# Patient Record
Sex: FEMALE | Race: WHITE | NOT HISPANIC OR LATINO | Employment: OTHER | URBAN - METROPOLITAN AREA
[De-identification: names, ages, dates, MRNs, and addresses within clinical notes are randomized per-mention and may not be internally consistent; named-entity substitution may affect disease eponyms.]

---

## 2017-05-26 ENCOUNTER — TELEPHONE (OUTPATIENT)
Dept: TRANSPLANT | Facility: CLINIC | Age: 52
End: 2017-05-26

## 2017-05-26 NOTE — TELEPHONE ENCOUNTER
"Living Kidney Donor Evaluation Completed: May 25, 2017 20:25:46 CT Updated: May 25, 2017 20:27:12 CT  Donor Name: Donna Najera MRN: [id number] Note: : 1965 Age: 51Gender: Female Donor Height: 5  5\" Weight (lb): 168 BMI: 28.0  Donor Race:  Ethnicity: Not / Donor Preferred Language: English  Required?: No Current Marital Status:   Demographics: Home Address: [address] [place] [address] City: [place] State: [place] Zip: [address] Country: [place]  Best Phone: [phone]  Alt Phone: Donor Email: [email or web address] Best Phone Type: Mobile Alt Phone Type:   Preferred Contact Time(s): 1:00 PM-4:00 PM Preferred Contact Day(s): Mon, Tue, Wed, Thur, Fri  Donor Screen: PASSED Donor Referred by: Tx Candidate Donor self reported ABO: Unknown  Recipient Information: Recipient Name (Last, First): Azul Xavier Recipient :    1951  ... Donor Relationship: Member of the same community Recipient Diagnosis: Recipient ABO:   MEDICAL HISTORY:  \"total Hysterectomy \"  Dysfunctional Uterine Bleeding  History of pregnancy  MEDICATIONS:  None Reported  SURGICAL HISTORY:  Appendectomy  Cholecystectomy  Gastric Bypass, NOS  Hysterectomy and Oophorectomy  Splenectomy  Tubal Ligation  ALLERGIES:  Demerol : Nausea and/or Vomiting  SOCIAL HISTORY:  EtOH: Rare (1-2 drinks/year)  Illicit Drug Use: Denies  Tobacco: Current (1/2 ppd x 10 years)  SELF-REPORTED FUNCTIONAL STATUS:  \"I am able to participate in moderate recreational activities like golf, double tennis, dancing, throwing a baseball or football\"  Exercise (Not on a regular basis)  REVIEW OF ORGAN SYSTEMS: Airway or Lungs: No Blood Disorder: No Cancer: No Diabetes,Thyroid,Adrenal,Endocrine Disorder: No Digestive or Liver: No Female Health: Yes Heart or Circulatory System: No Immune Diseases: No Kidneys and Bladder: No Muscles,Bones,Joints: No Neuro: No Psych: No  FAMILY HISTORY: Confirmed:  Cancer (Mother, Father, " Sibling)  Denied:  Diabetes (denies)  Heart Disease (denies)  Hypertension (denies)  Kidney Disease (denies)  Kidney Stones (denies)  DONOR INFORMATION:  Level of Education: Some college education 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 (NO)  Commercial sex < 12 months. (NO)  Illicit IV drug use < 5yrs. (NO)  Other high risk sexual contact < 12 months. (NO)  EMERGENCY CONTACT INFORMATION:  Primary Secondary First Name: Shaila Last Name: Dania Phone Number: [phone]  Relationship: Child  First Name: Pino  Last Name: Dania Phone Number: [phone]  Relationship: Spouse  REASON FOR DONATION:   I have know the recipient for the past 9 months and I have seen her go through all of the ups and downs with her current illness and I just feel very healthy and I want to give back to someone and she has so much to live for and I want to help her.  PHYSICIAN CONTACT INFORMATION:

## 2017-05-26 NOTE — LETTER
REIMBURSEMENT INFORMATION FOR LIVING ORGAN DONORS    LIVING ORGAN DONOR: This form MUST accompany & remain attached to Orders &  given to Provider and/or Healthcare Facility Business Office    PROVIDER/FACILITY INSTRUCTIONS: By accepting to perform these services for living organ  donation, the provider/facility agrees to exclusively bill the [facility] instead of billing  the patient or any insurance provider and agrees to accept the reimbursement, as described below, as  payment in full for services rendered.    PROVIDER BILLING INSTRUCTIONS:  1. [facility] agrees to pay for all authorized testing ordered by our transplant  program that is related to living organ donation. The attached orders/tests are part of the donor  Evaluation.    2. Do not bill the donor or donor's insurance. Send an itemized invoice, claim or statement to:    [facility]  Transplant Finance/Donor Billing  [address] [place] [address]    3. Billing statements must include the patient first and last name, date of birth, the CPT procedure code  and date of service. Please bill service on the ORIGINAL UBO4 or 1500 with appropriate CPT/HCPCS  codes along with W-9 and send to the above address to insure timely reimbursement.    4. Claims should be submitted no later than six months from the date when services are rendered.  Claims denied for late submission should not be billed to the donor or their private insurance carrier.    5. [facility] will reimburse all charges at 100% of the Medicare Fee Schedule as  defined in the Code of Federal Regulations (CFR) 42, Chapter IV. This is to be considered payment  in full. [facility], the patient, and/or the patient's insurance are NOT to  be billed any balance, co-payment, or deductible, per Medicare regulations. **ATTN: Facility  providing services for attached/enclosed Living  Donor Orders; If facility does NOT AGREE to  the reimbursement rate stated above, PLEASE DENY SERVICES & refer Donor/patient back to  their [facility].    6. Patients are NOT to make any payments at the time of service.    Please forward this information to your billing department so that a donor account can be set up with  these instructions.    Should you have any questions, please contact the Solid Organ Transplant office and ask for donor billing  at [phone] or [phone], Monday - Friday, 8:00 a.m. to 4:00 p.m.  Thank you for your assistance.

## 2017-06-01 NOTE — TELEPHONE ENCOUNTER
Thinks abo=AB which is incompat. Discussed PEP and will think over.Hx 2 Gastric Bypass surgeries. First was in 1989 and it was a Vertical Banding. Second was Ruen Y in 2007. Discussed need for [employer] .Currently smokes and cessation was discussed. Will now send donor pkt ONLY with [employer] sheet and billing letter.

## 2017-11-13 ENCOUNTER — TELEPHONE (OUTPATIENT)
Dept: TRANSPLANT | Facility: CLINIC | Age: 52
End: 2017-11-13

## 2021-06-01 ENCOUNTER — RECORDS - HEALTHEAST (OUTPATIENT)
Dept: ADMINISTRATIVE | Facility: CLINIC | Age: 56
End: 2021-06-01

## 2021-06-02 ENCOUNTER — RECORDS - HEALTHEAST (OUTPATIENT)
Dept: ADMINISTRATIVE | Facility: CLINIC | Age: 56
End: 2021-06-02

## 2021-06-03 ENCOUNTER — RECORDS - HEALTHEAST (OUTPATIENT)
Dept: ADMINISTRATIVE | Facility: CLINIC | Age: 56
End: 2021-06-03

## 2021-06-16 PROBLEM — K56.609 SBO (SMALL BOWEL OBSTRUCTION) (H): Status: ACTIVE | Noted: 2020-10-11

## 2021-06-16 PROBLEM — K56.609 SMALL BOWEL OBSTRUCTION (H): Status: ACTIVE | Noted: 2020-10-11

## 2021-06-16 PROBLEM — B02.29 POST HERPETIC NEURALGIA: Status: ACTIVE | Noted: 2020-10-11

## 2021-06-16 PROBLEM — U07.1 CLINICAL DIAGNOSIS OF COVID-19: Status: ACTIVE | Noted: 2020-10-11

## 2021-06-16 PROBLEM — Z98.84 H/O GASTRIC BYPASS: Status: ACTIVE | Noted: 2020-10-11

## 2021-06-16 PROBLEM — R91.8 PULMONARY NODULES: Status: ACTIVE | Noted: 2020-10-11

## 2021-07-21 ENCOUNTER — RECORDS - HEALTHEAST (OUTPATIENT)
Dept: ADMINISTRATIVE | Facility: CLINIC | Age: 56
End: 2021-07-21

## 2022-06-14 ENCOUNTER — OFFICE VISIT (OUTPATIENT)
Dept: ENDOCRINOLOGY | Facility: CLINIC | Age: 57
End: 2022-06-14
Payer: COMMERCIAL

## 2022-06-14 VITALS
HEART RATE: 79 BPM | DIASTOLIC BLOOD PRESSURE: 91 MMHG | WEIGHT: 205.3 LBS | SYSTOLIC BLOOD PRESSURE: 155 MMHG | BODY MASS INDEX: 34.2 KG/M2 | OXYGEN SATURATION: 99 % | HEIGHT: 65 IN

## 2022-06-14 DIAGNOSIS — Z98.84 H/O GASTRIC BYPASS: Primary | ICD-10-CM

## 2022-06-14 DIAGNOSIS — R10.13 ABDOMINAL PAIN, EPIGASTRIC: ICD-10-CM

## 2022-06-14 DIAGNOSIS — K56.609 SMALL BOWEL OBSTRUCTION (H): ICD-10-CM

## 2022-06-14 PROBLEM — R60.0 EDEMA OF LOWER EXTREMITY: Status: ACTIVE | Noted: 2022-06-14

## 2022-06-14 PROBLEM — G47.33 OBSTRUCTIVE SLEEP APNEA SYNDROME: Status: ACTIVE | Noted: 2022-06-14

## 2022-06-14 PROBLEM — E61.1 IRON DEFICIENCY: Status: ACTIVE | Noted: 2020-02-04

## 2022-06-14 PROBLEM — Z86.16 HISTORY OF 2019 NOVEL CORONAVIRUS DISEASE (COVID-19): Status: ACTIVE | Noted: 2020-12-10

## 2022-06-14 PROCEDURE — 99205 OFFICE O/P NEW HI 60 MIN: CPT | Performed by: NURSE PRACTITIONER

## 2022-06-14 RX ORDER — DEXTROAMPHETAMINE SACCHARATE, AMPHETAMINE ASPARTATE, DEXTROAMPHETAMINE SULFATE AND AMPHETAMINE SULFATE 5; 5; 5; 5 MG/1; MG/1; MG/1; MG/1
TABLET ORAL
COMMUNITY
Start: 2022-02-22

## 2022-06-14 RX ORDER — OMEPRAZOLE 40 MG/1
40 CAPSULE, DELAYED RELEASE ORAL DAILY
COMMUNITY
Start: 2022-05-05

## 2022-06-14 RX ORDER — DEXTROAMPHETAMINE SACCHARATE, AMPHETAMINE ASPARTATE MONOHYDRATE, DEXTROAMPHETAMINE SULFATE AND AMPHETAMINE SULFATE 7.5; 7.5; 7.5; 7.5 MG/1; MG/1; MG/1; MG/1
CAPSULE, EXTENDED RELEASE ORAL
COMMUNITY
Start: 2022-05-26

## 2022-06-14 RX ORDER — HYOSCYAMINE SULFATE 0.125 MG
0.12 TABLET ORAL EVERY 4 HOURS PRN
Qty: 60 TABLET | Refills: 1 | Status: SHIPPED | OUTPATIENT
Start: 2022-06-14

## 2022-06-14 RX ORDER — CYANOCOBALAMIN 1000 UG/ML
INJECTION, SOLUTION INTRAMUSCULAR; SUBCUTANEOUS
COMMUNITY
Start: 2019-12-02

## 2022-06-14 RX ORDER — IBUPROFEN 600 MG/1
600 TABLET, FILM COATED ORAL EVERY 6 HOURS PRN
COMMUNITY

## 2022-06-14 RX ORDER — ONDANSETRON 4 MG/1
4 TABLET, ORALLY DISINTEGRATING ORAL
COMMUNITY
Start: 2022-04-27

## 2022-06-14 RX ORDER — ACETAMINOPHEN 500 MG
650 TABLET ORAL EVERY 6 HOURS PRN
COMMUNITY

## 2022-06-14 ASSESSMENT — PAIN SCALES - GENERAL: PAINLEVEL: MODERATE PAIN (5)

## 2022-06-14 NOTE — LETTER
Date:Nena 15, 2022      Provider requested that no letter be sent. Do not send.       [facility]

## 2022-06-14 NOTE — LETTER
"2022       RE: Donna Najera  [address]  [facility] [address]     Dear Colleague,    Thank you for referring your patient, Donna Najera, to the [facility]. Please see a copy of my visit note below.    New Re-establish Bariatric Surgery Consultation Note    2022    RE: Donna Najera  MR#: [id number]  : 1965      Referring provider:       6/10/2022   Who referred you? France Arriaga NP       Chief Complaint/Reason for visit: re-establish bariatric care    Dear No primary care provider on file.,    I had the pleasure of seeing your patient, Donna Najera, to re-establish bariatric care. As you know, Donna Najera is 56 year old.  She has a height of 5' 5\", a weight of 205 lbs 4.8 oz, and calculated Body mass index is 34.16 kg/m .. She has a history of VBG to RNYGB with Dr. Coffey in  at [facility]. Was having recurrent episodes of nausea/ vomiting along with persistent difficulty with weight loss. 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 VBG 16 years prior to that.     Assessment & Plan   Problem List Items Addressed This Visit        Digestive    Small bowel obstruction (H)       Other    H/O gastric bypass - Primary     VBG in early  with revision to RNYGB with Dr. JACOME in  due to recurrent episodes of nausea and vomiting and difficulty with weight loss. Revision there was extensive dense adhesions and ultimately spleen needed to be removed. She did well for a number of years s/p RNYGB with weight loss and maintenance. She had significant epigastric pain starting in  and diagnosed with H.Pylori in . During that time she was treated for pain with opioids and ended up becoming addicted. She was treated in rehab and is now clean, trying to avoid opioids when able. 2 episodes of SBO between  and  and otherwise did very well between  " "and 2020- minimal issues, felt great. In early 2020, she contracted covid 19 and had significant course of infection and has had persistent long haul symptoms to include headaches, narcolepsy, rashes, fatigue to where there will be days in a row where she \"hybernates\", increased arthritis. Significant decrease in activity level from fatigue and arthritis contributing to weight gain of 40lb. Has also noted increased frequency of SOB since 2020 (5 in the last 2 years) most recently 4/2022. She notes each episode of SOB becomes more sever and more difficult to manage.     Since hospitalization x 3 days in April for SOB. She reports onset of epigastric pain with swallowing of liquids and solids which lasts 1-2 hours and often gradually moves to RUQ pain over time. Pain is described as a grabbing pain of the intestines. She does not however get these symptoms with every eating episode. If she keeps food \"mushy\" she tolerates it much better. For pain she has tried omeprazole 40mg daily, ibuprofen 600mg (2-3 at a time), maalox, gasx none of which are particularly helpful. Soda, cold water, spicy food, meat tend to make things worse. She notes tenderness to crossing arms across epigastric region of stomach. Negative H.Pylori in May 2022.    No tolerating any supplements- pain, upset stomach     CT scans reviewed.    Concern for stricture based on Ct and symptoms. Recommend EGD. Suspect adhesions contributing to stool burden and abdominal pain. Will review with bariatric surgeon. Discussed keeping stool soft- metamucil already on board and will add miralax daily. Discussed that adhesions are not likely to be repaired surgically as it increases risk for additional adhesions but will defer to surgeon. Discussed potential for referral to pain management in the future. Will start with trying levsin for cramping. Declines maximizing PPI at this time. Encouraged decrease NSAID use due to risk for ulcers.     Discussed diet and how " that is potentially impacting her symptoms. High carbohydrate diets cause gas and bloating s/p RNYGB. Decreasing CHO will significantly improve symptoms. Additionally, avoiding carbonation can be beneficial. She declines dietitian visit at this time.     Bariatric labs ordered. Concern for developing vitamin deficiency and malnutrition. Recommend adding lactos free protein shake- will find list.    Plan:  Schedule endoscopy   -Call Carl at [phone] for scheduling  Labs when able  Continue omeprazole 40mg   Miralax 1 capful daily in 8 oz of water daily   Continue metamucil   Consider working on decreasing bread, rice, pasta- can help with bloating and gas and GI issues   Consider trying non-dairy / non lactose protein shake                Relevant Medications    hyoscyamine (LEVSIN) 0.125 MG tablet    Other Relevant Orders    Comprehensive metabolic panel    Ferritin    CBC with platelets    Parathyroid Hormone Intact    Vitamin A    Vitamin D Deficiency    Vitamin B12    Vitamin B1 plasma    Adult Gastro Ref - Procedure Only      Other Visit Diagnoses     Abdominal pain, epigastric        Relevant Medications    hyoscyamine (LEVSIN) 0.125 MG tablet    Other Relevant Orders    Comprehensive metabolic panel    Ferritin    CBC with platelets    Parathyroid Hormone Intact    Vitamin A    Vitamin D Deficiency    Vitamin B12    Vitamin B1 plasma    Adult Gastro Ref - Procedure Only             89 minutes spent on the date of the encounter doing chart review, history and exam, documentation and further activities per the note      HISTORY OF PRESENT ILLNESS:  Weight Loss History Reviewed with Patient 6/10/2022   How long have you been overweight? Since late teens through early 20's   What is the most that you have ever weighed? 360   What is the most weight you have lost? 180   I have tried the following methods to lose weight Watching portions or calories, Weight Watchers, Slimfast, Prescription Medications, Weight  "Loss Surgery   Have you ever had weight loss surgery? Yes   Please select the type of weight loss surgery you had (select all that apply): gastric bypass / Marcella-en-Y, vertical banded gastroplasty / VBG, revision of previous weight loss surgery     - H.Pylori - was having a lot of abdominal pain with this.   Opioid addiction for time after that     5 SBO since . Increased frequency since - 2 episodes since then. Long haul covid since - significant decrease in activity level with increased weight as a result. Seem to be more difficult to recover from     2022 ED  Abdominal pain/ nausea/ vomiting - CT showed \"mild to moderate stool throughout the colon with fecalization of the distal ileum which may be due to the stool burden, no evidence of obstruction\". Was hospitalized for 3 days.     S/p bowel obstruction 4/10/2022    New symptoms  Painful swallowing of solids and liquids  Can feel things go through     Pain- pain not after every meal, comes and goes, lasting 1-2 hours, maalox and gas x not helpful    Soda makes it worse, cold water, spicy foods, meat - all at the epigastric region   Also tender to pressure.   Only able to eat \"mushy\" things right now without pain   Omeprazole 40mg once daily   +nsaids for pain     -h.pylori May 2022     Supplements: can't swallow and causes abdominal pain    Lactose intolerance   Dumping with sweets and ice cream      Hypoglycemia- down as low as 32- early morning or going too long with out eating       CO-MORBIDITIES OF OBESITY INCLUDE:     6/10/2022   I have the following health issues associated with obesity: Pre-Diabetes, High Cholesterol, Fatty Liver, Hypothyroidism       PAST MEDICAL HISTORY:  History reviewed. No pertinent past medical history.    PAST SURGICAL HISTORY:  Past Surgical History:   Procedure Laterality Date     [facility]  DELIVERY ONLY      Description:  Section;  Recorded: 2009;  Comments: x2     [facility] GASTRIC " BYPASS,OBESE<100CM MARCELLA-EN-Y      Description: Gastric Surgery For Morbid Obesity Bypass With Marcella-en-Y;  Proc Date: 01/01/2007;     C GASTRIC BYPASS,OBESE<100CM MARCELLA-EN-Y      Description: Gastric Surgery For Morbid Obesity Bypass With Marcella-en-Y;  Recorded: 01/04/2012;     [facility] INCIS BILE DUCT/SPHINC,EXPL/RMV CALC      Description: Common Bile Duct Exploration With Sphincterotomy;  Proc Date: 04/01/2006;  Comments: Open due to retained stone     Z LAP,CHOLECYSTECTOMY/EXPLORE      Description: Cholecystectomy Laparoscopic;  Proc Date: 03/01/2006;     [facility] LIGATE FALLOPIAN TUBE      Description: Tubal Ligation;  Proc Date: 07/01/1999;     [facility] TOTAL ABDOM HYSTERECTOMY      Description: Total Abdominal Hysterectomy;  Proc Date: 03/01/2002;     C TOTAL ABDOM HYSTERECTOMY      Description: Total Abdominal Hysterectomy;  Recorded: 02/10/2011;       FAMILY HISTORY:   No family history on file.    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 Yes   Who do you have in your support network that can be available to help you for the first 2 weeks after surgery? Family   Who can you count on for support throughout your weight loss surgery journey? Family   Can you afford 3 meals a day?  Yes   Can you afford 50-60 dollars a month for vitamins? Yes       HABITS:     6/10/2022   How often do you drink alcohol? Never   Do you currently use any of the following Nicotine products? cigarettes   Have you ever used any of the following nicotine products? No   Have you or are you currently using street drugs or prescription strength medication for which you do not have a prescription for? No   Do you have a history of chemical dependency (alcohol or drug abuse)? Yes        PSYCHOLOGICAL HISTORY:   Psychological History Reviewed With Patient 6/10/2022   Have you ever attempted suicide? Never.   Have you had thoughts of suicide in the past year? No   Have you ever been hospitalized for mental illness or a suicide attempt? Never.   Do you have a history of chronic pain? Yes   Have you ever been diagnosed with fibromyalgia? No   Are you currently seeing a therapist or counselor?  No   Are you currently seeing a psychiatrist? No       ROS:     6/10/2022   Skin:  Skin fold rashes (groin or other folds), Varicose veins   HEENT: Headaches, Teeth, dentures, or bridges needing repairs   Musculoskeletal: Joint Pain, Arthritis   Cardiovascular: None of the above   Pulmonary: None of the above   Gastrointestinal: None of the above   Genitourinary: None of the above   Hematological: Family history of blood clots   Neurological: None of the above   Female only: Post-menopausal       EATING BEHAVIORS:     6/10/2022   Have you or anyone else thought that you had an eating disorder? No   Do you currently binge eat (eat a large amount of food in a short time)? No   Are you an emotional eater? No   Do you get up to eat after falling asleep? No       EXERCISE:     6/10/2022   How often do you exercise? Never   What keeps you from being more active?  I am as active as I can possbily be, Pain       MEDICATIONS:  Current Outpatient Medications   Medication Sig Dispense Refill     acetaminophen (TYLENOL) 500 MG tablet Take 650 mg by mouth every 6 hours as needed for mild pain       amphetamine-dextroamphetamine (ADDERALL XR) 30 MG 24 hr capsule TAKE 1 CAPSULE BY MOUTH EVERY MORNING TO BE USED WITH 20 MG ER FOR A TOTAL OF 50 MG DAILY       amphetamine-dextroamphetamine (ADDERALL) 20 MG tablet TAKE 1 TABLET BY MOUTH THREE TIMES DAILY       cyanocobalamin (CYANOCOBALAMIN) 1000 MCG/ML injection        hyoscyamine (LEVSIN) 0.125 MG tablet Take 1 tablet (125 mcg) by mouth every 4 hours as needed for cramping  "60 tablet 1     ibuprofen (ADVIL/MOTRIN) 600 MG tablet Take 600 mg by mouth every 6 hours as needed for moderate pain       omeprazole (PRILOSEC) 40 MG DR capsule Take 40 mg by mouth daily       ondansetron (ZOFRAN ODT) 4 MG ODT tab Take 4 mg by mouth         ALLERGIES:  Allergies   Allergen Reactions     Fentanyl Nausea and Vomiting     Other reaction(s): Gastrointestinal     Gabapentin Nausea     Meperidine Nausea and Rash   CT with IV contrast 4/29/2022  BOWEL: Mild to moderate stool throughout the entire colon with incidental colonic interposition between the liver and abdominal wall and fecalization appearance of the distal ileum. Postoperative changes of gastric bypass with normal remnant stomach without significant distention. Postoperative changes of small bowel in the left mid abdomen with mild dilatation, likely due to a side-to-side anastomosis. Previous inflammatory changes have resolved. The small bowel is normal in caliber without obstruction. Postoperative changes of the abdominal wall. No inflammatory changes or ascites.     CT scan with IV contrast 4/27/2022  BOWEL: Prior Marcella-en-Y gastric bypass. Mild fluid distention of proximal small bowel near the distal anastomosis and associated mild mesenteric congestion; when compared to 04/10/2022, findings are significantly less severe and may represent residual dilation and edema. No free air. Trace free fluid.        PHYSICAL EXAM:  Objective    BP (!) 155/91 (BP Location: Left arm, Patient Position: Sitting, Cuff Size: Adult Large)   Pulse 79   Ht 1.651 m (5' 5\")   Wt 93.1 kg (205 lb 4.8 oz)   SpO2 99%   BMI 34.16 kg/m    BP (!) 155/91 (BP Location: Left arm, Patient Position: Sitting, Cuff Size: Adult Large)   Pulse 79   Ht 1.651 m (5' 5\")   Wt 93.1 kg (205 lb 4.8 oz)   SpO2 99%   BMI 34.16 kg/m    Body mass index is 34.16 kg/m .  Physical Exam   GENERAL: Healthy, alert and no distress  EYES: Eyes grossly normal to inspection.  No discharge " or erythema, or obvious scleral/conjunctival abnormalities.  RESP: No audible wheeze, cough, or visible cyanosis.  No visible retractions or increased work of breathing.    SKIN: Visible skin clear. No significant rash, abnormal pigmentation or lesions.  NEURO: Cranial nerves grossly intact.  Mentation and speech appropriate for age.  PSYCH: Mentation appears normal, affect normal/bright, judgement and insight intact, normal speech and appearance well-groomed.       In summary, Donna Najera has Class I obesity with a body mass index of Body mass index is 34.16 kg/m . kg/m2 and the comorbidities stated above. She has a history of VBG to RNYGB in 2007 and is here to re-establish bariatric care and discuss abdominal pain.          Sincerely,     Megan Villeda NP             Again, thank you for allowing me to participate in the care of your patient.      Sincerely,    Megan Villeda NP

## 2022-06-14 NOTE — PATIENT INSTRUCTIONS
"Thank you for allowing us the privilege of caring for you. We hope we provided you with the excellent service you deserve.   Please let us know if there is anything else we can do for you so that we can be sure you are completely satisfied with your care experience.    To ensure the quality of our services you may be receiving a patient satisfaction survey from an independent patient satisfaction monitoring company.    The greatest compliment you can give is a \"Likely to Recommend\"    Your visit was with Megan Villeda NP today.    Instructions per today's visit:     Yoel Najera, it was great to visit with you today.  Here is a review of our visit.  If our clinic scheduler is not able to reach you please call [phone] to schedule your next appointments.    Schedule endoscopy   -Call Carl at [phone] for scheduling  Labs when able  Continue omeprazole 40mg   Miralax 1 capful daily in 8 oz of water daily   Continue metamucil   Consider working on decreasing bread, rice, pasta- can help with bloating and gas and GI issues   Consider trying non-dairy / non lactose protein shake       Information about Video Visits with [facility]: video visit information  _________________________________________________________________________________________________________________________________________________________  If you are asked by your clinic team to have your blood pressure checked:  [facility] do offer several locations for blood pressure checks. Please follow the below link to schedule an appointment. Scheduling an appointment at the pharmacy for a blood pressure check is now preferred.    Appointment Plus (appointment-plus.[employer])  _________________________________________________________________________________________________________________________________________________________  Important contact and scheduling information:  Please call our contact center at [phone] to schedule your next " appointments.  To find a lab location near you, please call [phone].  For any nursing questions or concerns call Lou Sanchez LPN at [phone] or Miranda Galan RN at [phone]  Please call during clinic hours Monday through Friday 8:00a - 4:00p if you have questions or you can contact us via [employer] at anytime and we will reply during clinic hours.    Lab results will be communicated through My Chart or letter (if My Chart not used). Please call the clinic if you have not received communication after 1 week or if you have any questions.?  Clinic Fax: [phone]    _________________________________________________________________________________________________________________________________________________________  Meal Replacement Products:    Here is the link to our new e-store where you can purchase our meal replacement products     [employer] E-Store  [place].[email or web address]    The one week starter kit is a great way to sample a variety of products and see what works for you.    If you want more information about the product go to: [employer]    If you are an employee or [facility] or  [employer] [place] please contact your care team for a 10% estore discount    Free Shipping for orders over $75     Benefits of meal replacements products:    Portion and calorie control  Improved nutrition  Structured eating  Simplified food choices  Avoid contact with trigger foods  _________________________________________________________________________________________________________________________________________________________  Interested in working with a health ?  Health coaches work with you to improve your overall health and wellbeing.  They look at the whole person, and may involve discussion of different areas of life, including, but not limited to the four pillars of health (sleep, exercise, nutrition, and stress management). Discuss with  your care team if you would like to start working a health .  Health Coaching-3 Pack: Schedule by calling [phone]    $99 for three health coaching visits    Visits may be done in person or via phone    Coaching is a partnership between the  and the client; Coaches do not prescribe or diagnose    Coaching helps inspire the client to reach his/her personal goals   _________________________________________________________________________________________________________________________________________________________  24 Week Healthy Lifestyle Plan:    Our mission in the 24-week Healthy Lifestyle Plan is to provide you with individualized care by giving you the tools, education and support you need to lose weight and maintain a healthy lifestyle. In your 24-week journey, you ll be supported by a dedicated weight loss team that includes registered dietitians, medical weight management providers, health coaches, and nurses -- all with special expertise in weight loss -- to help you every step of the way.     Monthly meetings with your registered dietician or medical weight management provider help to review your progress, update your care plan, and make any adjustments needed to ensure success. Between these visits, weekly and bi-weekly health  visits will help you focus on the four pillars of weight loss -- stress, sleep, nutrition, and exercise -- and how you can best adapt each to achieve sustainable weight loss results.    In addition, you will be given exclusive access to online wellbeing classes through [employer].  Your initial visit will be with a medical weight management provider who will help to understand your weight loss goals and ensure this program is the right fit for you. Please let our team know if you are interested in the 24 week plan by sending a message to your care team or calling [phone] to  "schedule.  _________________________________________________________________________________________________________________________________________________________    COMPREHENSIVE WEIGHT MANAGEMENT PROGRAM  VIRTUAL SUPPORT GROUPS    For Support Group Information:      We offer support groups for patients who are working on weight loss and considering, preparing for or have had weight loss surgery.   There is no cost for this opportunity.  You are invited to attend the?Virtual Support Groups?provided by any of the following locations:    [facility] via Microsoft Teams with Camilla John RN  2.   [place] via Mobshop with Dk Keenan, PhD, LP  3.   [place] via Mobshop with Malena Cole RN  4.   [facility] via [employer] Online Teams with Malena Roper [facility]    The following Support Group information can also be found on our website:  [email or web address]    [facility] Weight Loss Surgery Support Group    [facility] Weight Loss Surgery Support Group  The support group is a patient-lead forum that meets monthly to share experiences, encouragement and education. It is open to those who have had weight loss surgery, are scheduled for surgery, and those who are considering surgery.   WHEN: This group meets on the 3rd Wednesday of each month from 5:00PM - 6:00PM virtually using Microsoft Teams.   FACILITATOR: Led by Camilla John, IRINA, LD, RN, the program's Clinical Coordinator.   TO REGISTER: Please contact the clinic via [employer] or call the nurse line directly at [phone] to inform our staff that you would like an invite sent to you and to let us know the email you would like the invite sent to. Prior to the meeting, a link with directions on how to join the meeting will be sent to you.    2022 Meetings  January 19: \"Let's Talk\" a time for the group to share.  February 16: \"Let's Talk\" a time for the group to " "share.  March 16: Guest Speakers: Psychologists, Myesha Rodriges, PhD,LP and Francheska Lyons PsMiguel Angel,  April 20: Guest Speaker: Health , Jonelle Gutierrez, [facility],CHES, [facility]  May 18: Guest Speaker: DietitianMarcus, IRINA, LP  Nena 15: \"Let's Talk\" a time for the group to share.  July 20: \"Let's Talk\" a time for the group to share.  August 17: TBA  September 21: TBA  October 19: Guest Speaker: Dr Maynor Dumont MD Pulmonologist and Sleep Medicine Physician, \"Getting a Good Night's Sleep\".  November 16: TBA  December 21: TBA    [facility] Support Groups    Connections: Bariatric Care Support Group?  This is open to all [facility] (and those external to this program) pre- and post- operative bariatric surgery patients as well as their support system.   WHEN: This group meets the 2nd Tuesday of each month from 6:30 PM - 8:00 PM virtually using Microsoft Teams.   FACILITATOR: Led by Dk Keenan, Ph.D who is a Licensed Psychologist with the [facility] Comprehensive Weight Management Program.   TO REGISTER: Please send an email to Dk Keenan, Ph.D.,  at?[email or web address]?if you would like an invitation to the group and to learn about using Microsoft Teams.    2022 Meetings  January 11: Diana Mosley, PharmD, Pharmacy Resident at [facility], \"Medications and Bariatric Surgery\".  February 8: Open Forum  March 8: Mynor Montemayor MD, \"Exercise and Bariatric Surgery\".  April 12  May 10  Nena 14    Connections: Post-Operative Bariatric Surgery Support Group  This is a support group for [facility] bariatric patients (and those external to [facility]) who have had bariatric surgery and are at least 3 months post-surgery.  WHEN: This support group meets the 4th Wednesday of the month from 11:00 AM - 12:00 PM virtually using Microsoft Teams.   FACILITATOR: Led by Certified Bariatric Nurse, Malena Cole RN.   TO REGISTER: Please send an email to Malena" "at [email or web address] if you would like an invitation to the group and to learn about using Microsoft Teams.    2022 Meetings  January 26  February 23  March 23  April 27  May 25  Nena 22    [facility] Healthy Lifestyle Virtual Support Group    Healthy Lifestyle Virtual Support Group?  This is 60 minutes of small group guided discussion, support and resources. All are welcome who want a healthy lifestyle.  WHEN: This group meets monthly on a Friday from 12:30 PM - 1:30 PM virtually using Microsoft Teams.   FACILITATOR: Led by National Board Certified Health and , Malena Roper [facility].   TO REGISTER: Please send an email to Malena at?[email or web address] to receive monthly invites to the group or if you have any questions about having a health .  Prior to the meeting, a link with directions on how to join the meeting will be sent to you.    2022 Meetings  January 21: Shelbi Kline MS, RN, CIC, CBN, \"Healthy Habits\"  February 25: Open Forum  March 18: \"Setting Limits and Boundaries\"  April 29: Karin Barth RD, \"Meal Planning Made Easy\"  May 20: Open Forum  June: To be determined  ____________________________________________________________________________________________________________________________________________________________________________  [facility] Get Moving Program  Our team of physical therapists is trained to help you understand and take control of your condition. They will perform a thorough evaluation to determine your ability for activity and develop a customized plan to fit your goals and physical ability.  Scheduling: Unsure if the Get Moving program is right for you? Discuss the program with your medical provider or diabetes educator. You can also call us at [phone] to ask questions or schedule an appointment.   [facility] Get Moving " Program  ____________________________________________________________________________________________________________________________________________________________________________  [facility] Diabetes Prevention Program (DPP)  If you have prediabetes and Medicare please contact us via [employer] to learn more about the Diabetes Prevention Program (DPP)  Program Details:  [facility] offers the year-long Diabetes Prevention Program (DPP). The program helps you to make lifestyle changes that prevent or delay type 2 diabetes by supporting healthy eating, increased physical activity, stress reduction and use of coping skills.   On average, previous [facility] DPP cohorts have lost and maintained at least 5% of their starting weight throughout the program and averaged more than 150 minutes of physical activity per week.  Participants meet weekly for one-hour group sessions over sixteen weeks, every other week for the next 8 weeks, and monthly for the last six months.   A year-long maintenance program is also available for participants who complete the first year.   Location & Cost:   During the COVID-19 Public Health Emergency, the program is offered virtually. When in-person classes can resume, they will be held at [facility].  For people with Medicare, the program is covered in full. A self-pay option will also be available for those with non-Medicare insurance plans.   _________________________________________________________________________________________________________________________________________________________  Bluetooth Scale:    We hope to provide you with high quality virtual healthcare visits while social distancing for COVID-19 is necessary, as well as in the future when virtual visits may be more convenient for you.     Our technology team made it possible for Bluetooth scales to send weight measurements to our electronic medical record. This allows  weights from you weighing at home to securely flow into the medical record, which will improve telephone and virtual visits.   Additionally, studies have shown that adults actually lose more weight when their weights are automatically sent to someone else, and also that this process is not stressful for those adults.    Below is a link for purchasing the scale, with a discount code for our patients. You may call your insurance company to see if they will reimburse you for the cost of the scale, as a piece of durable medical equipment. The scales only go up to a weight of 400 pounds. This is an issue and we are working with the developer on increasing this. We found no scales that go over 400lb that have blue-tooth for connecting to [employer].    Scale to purchase: the [employer]  Body  Scale: [email or web address].ds    Discount Code: We have a discount code for our patients to bring the cost down to $50, Discount code is: [place]_Scale_20%off  _______________________________________________________________________________________________________________________________________________________________________________    To work with a Behavioral Health Psychologist:    Call to schedule:    Rich Anderson - [phone]  Brayan Wu - [phone]  Haven Olivia - [phone]  Lindsey Goff - [phone]   Elba Barajas PhD (cannot accept Medicare) [phone]        Thank you,   [facility] Comprehensive Weight Management Team

## 2022-06-14 NOTE — PROGRESS NOTES
"New Re-establish Bariatric Surgery Consultation Note    2022    RE: Donna Najera  MR#: [id number]  : 1965      Referring provider:       6/10/2022   Who referred you? France Arriaga NP       Chief Complaint/Reason for visit: re-establish bariatric care    Dear No primary care provider on file.,    I had the pleasure of seeing your patient, Donna Najera, to re-establish bariatric care. As you know, Donna Najera is 56 year old.  She has a height of 5' 5\", a weight of 205 lbs 4.8 oz, and calculated Body mass index is 34.16 kg/m .. She has a history of VBG to RNYGB with Dr. Coffey in  at [facility]. Was having recurrent episodes of nausea/ vomiting along with persistent difficulty with weight loss. 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 VBG 16 years prior to that.     Assessment & Plan   Problem List Items Addressed This Visit        Digestive    Small bowel obstruction (H)       Other    H/O gastric bypass - Primary     VBG in early  with revision to RNYGB with Dr. JACOME in  due to recurrent episodes of nausea and vomiting and difficulty with weight loss. Revision there was extensive dense adhesions and ultimately spleen needed to be removed. She did well for a number of years s/p RNYGB with weight loss and maintenance. She had significant epigastric pain starting in  and diagnosed with H.Pylori in . During that time she was treated for pain with opioids and ended up becoming addicted. She was treated in rehab and is now clean, trying to avoid opioids when able. 2 episodes of SBO between  and  and otherwise did very well between  and - minimal issues, felt great. In early , she contracted covid 19 and had significant course of infection and has had persistent long haul symptoms to include headaches, narcolepsy, rashes, fatigue to where there will be days in a row where she \"hybernates\", increased arthritis. Significant decrease " "in activity level from fatigue and arthritis contributing to weight gain of 40lb. Has also noted increased frequency of SOB since 2020 (5 in the last 2 years) most recently 4/2022. She notes each episode of SOB becomes more sever and more difficult to manage.     Since hospitalization x 3 days in April for SOB. She reports onset of epigastric pain with swallowing of liquids and solids which lasts 1-2 hours and often gradually moves to RUQ pain over time. Pain is described as a grabbing pain of the intestines. She does not however get these symptoms with every eating episode. If she keeps food \"mushy\" she tolerates it much better. For pain she has tried omeprazole 40mg daily, ibuprofen 600mg (2-3 at a time), maalox, gasx none of which are particularly helpful. Soda, cold water, spicy food, meat tend to make things worse. She notes tenderness to crossing arms across epigastric region of stomach. Negative H.Pylori in May 2022.    No tolerating any supplements- pain, upset stomach     CT scans reviewed.    Concern for stricture based on Ct and symptoms. Recommend EGD. Suspect adhesions contributing to stool burden and abdominal pain. Will review with bariatric surgeon. Discussed keeping stool soft- metamucil already on board and will add miralax daily. Discussed that adhesions are not likely to be repaired surgically as it increases risk for additional adhesions but will defer to surgeon. Discussed potential for referral to pain management in the future. Will start with trying levsin for cramping. Declines maximizing PPI at this time. Encouraged decrease NSAID use due to risk for ulcers.     Discussed diet and how that is potentially impacting her symptoms. High carbohydrate diets cause gas and bloating s/p RNYGB. Decreasing CHO will significantly improve symptoms. Additionally, avoiding carbonation can be beneficial. She declines dietitian visit at this time.     Bariatric labs ordered. Concern for developing vitamin " deficiency and malnutrition. Recommend adding lactos free protein shake- will find list.    Plan:  Schedule endoscopy   -Call Carl at [phone] for scheduling  Labs when able  Continue omeprazole 40mg   Miralax 1 capful daily in 8 oz of water daily   Continue metamucil   Consider working on decreasing bread, rice, pasta- can help with bloating and gas and GI issues   Consider trying non-dairy / non lactose protein shake                Relevant Medications    hyoscyamine (LEVSIN) 0.125 MG tablet    Other Relevant Orders    Comprehensive metabolic panel    Ferritin    CBC with platelets    Parathyroid Hormone Intact    Vitamin A    Vitamin D Deficiency    Vitamin B12    Vitamin B1 plasma    Adult Gastro Ref - Procedure Only      Other Visit Diagnoses     Abdominal pain, epigastric        Relevant Medications    hyoscyamine (LEVSIN) 0.125 MG tablet    Other Relevant Orders    Comprehensive metabolic panel    Ferritin    CBC with platelets    Parathyroid Hormone Intact    Vitamin A    Vitamin D Deficiency    Vitamin B12    Vitamin B1 plasma    Adult Gastro Ref - Procedure Only             89 minutes spent on the date of the encounter doing chart review, history and exam, documentation and further activities per the note      HISTORY OF PRESENT ILLNESS:  Weight Loss History Reviewed with Patient 6/10/2022   How long have you been overweight? Since late teens through early 20's   What is the most that you have ever weighed? 360   What is the most weight you have lost? 180   I have tried the following methods to lose weight Watching portions or calories, Weight Watchers, Slimfast, Prescription Medications, Weight Loss Surgery   Have you ever had weight loss surgery? Yes   Please select the type of weight loss surgery you had (select all that apply): gastric bypass / Marcella-en-Y, vertical banded gastroplasty / VBG, revision of previous weight loss surgery     2011- H.Pylori - was having a lot of abdominal pain with this.  "  Opioid addiction for time after that     5 SBO since . Increased frequency since - 2 episodes since then. Long haul covid since - significant decrease in activity level with increased weight as a result. Seem to be more difficult to recover from     2022 ED  Abdominal pain/ nausea/ vomiting - CT showed \"mild to moderate stool throughout the colon with fecalization of the distal ileum which may be due to the stool burden, no evidence of obstruction\". Was hospitalized for 3 days.     S/p bowel obstruction 4/10/2022    New symptoms  Painful swallowing of solids and liquids  Can feel things go through     Pain- pain not after every meal, comes and goes, lasting 1-2 hours, maalox and gas x not helpful    Soda makes it worse, cold water, spicy foods, meat - all at the epigastric region   Also tender to pressure.   Only able to eat \"mushy\" things right now without pain   Omeprazole 40mg once daily   +nsaids for pain     -h.pylori May 2022     Supplements: can't swallow and causes abdominal pain    Lactose intolerance   Dumping with sweets and ice cream      Hypoglycemia- down as low as 32- early morning or going too long with out eating       CO-MORBIDITIES OF OBESITY INCLUDE:     6/10/2022   I have the following health issues associated with obesity: Pre-Diabetes, High Cholesterol, Fatty Liver, Hypothyroidism       PAST MEDICAL HISTORY:  History reviewed. No pertinent past medical history.    PAST SURGICAL HISTORY:  Past Surgical History:   Procedure Laterality Date     [facility]  DELIVERY ONLY      Description:  Section;  Recorded: 2009;  Comments: x2     [facility] GASTRIC BYPASS,OBESE<100CM MARCELLA-EN-Y      Description: Gastric Surgery For Morbid Obesity Bypass With Marcella-en-Y;  Proc Date: 2007;     [facility] GASTRIC BYPASS,OBESE<100CM MARCELLA-EN-Y      Description: Gastric Surgery For Morbid Obesity Bypass With Marcella-en-Y;  Recorded: 2012;     [facility] INCIS BILE DUCT/SPHINC,EXPL/RMV CALC      " Description: Common Bile Duct Exploration With Sphincterotomy;  Proc Date: 04/01/2006;  Comments: Open due to retained stone     [facility] LAP,CHOLECYSTECTOMY/EXPLORE      Description: Cholecystectomy Laparoscopic;  Proc Date: 03/01/2006;     [facility] LIGATE FALLOPIAN TUBE      Description: Tubal Ligation;  Proc Date: 07/01/1999;     [facility] TOTAL ABDOM HYSTERECTOMY      Description: Total Abdominal Hysterectomy;  Proc Date: 03/01/2002;     [facility] TOTAL ABDOM HYSTERECTOMY      Description: Total Abdominal Hysterectomy;  Recorded: 02/10/2011;       FAMILY HISTORY:   No family history on file.    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 Yes   Who do you have in your support network that can be available to help you for the first 2 weeks after surgery? Family   Who can you count on for support throughout your weight loss surgery journey? Family   Can you afford 3 meals a day?  Yes   Can you afford 50-60 dollars a month for vitamins? Yes       HABITS:     6/10/2022   How often do you drink alcohol? Never   Do you currently use any of the following Nicotine products? cigarettes   Have you ever used any of the following nicotine products? No   Have you or are you currently using street drugs or prescription strength medication for which you do not have a prescription for? No   Do you have a history of chemical dependency (alcohol or drug abuse)? Yes       PSYCHOLOGICAL HISTORY:   Psychological History Reviewed With Patient 6/10/2022   Have you ever attempted suicide? Never.   Have you had thoughts of suicide in the past year? No   Have you ever been hospitalized for mental illness or a suicide attempt? Never.   Do you have a history of chronic pain? Yes   Have you ever been diagnosed  with fibromyalgia? No   Are you currently seeing a therapist or counselor?  No   Are you currently seeing a psychiatrist? No       ROS:     6/10/2022   Skin:  Skin fold rashes (groin or other folds), Varicose veins   HEENT: Headaches, Teeth, dentures, or bridges needing repairs   Musculoskeletal: Joint Pain, Arthritis   Cardiovascular: None of the above   Pulmonary: None of the above   Gastrointestinal: None of the above   Genitourinary: None of the above   Hematological: Family history of blood clots   Neurological: None of the above   Female only: Post-menopausal       EATING BEHAVIORS:     6/10/2022   Have you or anyone else thought that you had an eating disorder? No   Do you currently binge eat (eat a large amount of food in a short time)? No   Are you an emotional eater? No   Do you get up to eat after falling asleep? No       EXERCISE:     6/10/2022   How often do you exercise? Never   What keeps you from being more active?  I am as active as I can possbily be, Pain       MEDICATIONS:  Current Outpatient Medications   Medication Sig Dispense Refill     acetaminophen (TYLENOL) 500 MG tablet Take 650 mg by mouth every 6 hours as needed for mild pain       amphetamine-dextroamphetamine (ADDERALL XR) 30 MG 24 hr capsule TAKE 1 CAPSULE BY MOUTH EVERY MORNING TO BE USED WITH 20 MG ER FOR A TOTAL OF 50 MG DAILY       amphetamine-dextroamphetamine (ADDERALL) 20 MG tablet TAKE 1 TABLET BY MOUTH THREE TIMES DAILY       cyanocobalamin (CYANOCOBALAMIN) 1000 MCG/ML injection        hyoscyamine (LEVSIN) 0.125 MG tablet Take 1 tablet (125 mcg) by mouth every 4 hours as needed for cramping 60 tablet 1     ibuprofen (ADVIL/MOTRIN) 600 MG tablet Take 600 mg by mouth every 6 hours as needed for moderate pain       omeprazole (PRILOSEC) 40 MG DR capsule Take 40 mg by mouth daily       ondansetron (ZOFRAN ODT) 4 MG ODT tab Take 4 mg by mouth         ALLERGIES:  Allergies   Allergen Reactions     Fentanyl Nausea and Vomiting      "Other reaction(s): Gastrointestinal     Gabapentin Nausea     Meperidine Nausea and Rash   CT with IV contrast 4/29/2022  BOWEL: Mild to moderate stool throughout the entire colon with incidental colonic interposition between the liver and abdominal wall and fecalization appearance of the distal ileum. Postoperative changes of gastric bypass with normal remnant stomach without significant distention. Postoperative changes of small bowel in the left mid abdomen with mild dilatation, likely due to a side-to-side anastomosis. Previous inflammatory changes have resolved. The small bowel is normal in caliber without obstruction. Postoperative changes of the abdominal wall. No inflammatory changes or ascites.     CT scan with IV contrast 4/27/2022  BOWEL: Prior Marcella-en-Y gastric bypass. Mild fluid distention of proximal small bowel near the distal anastomosis and associated mild mesenteric congestion; when compared to 04/10/2022, findings are significantly less severe and may represent residual dilation and edema. No free air. Trace free fluid.        PHYSICAL EXAM:  Objective    BP (!) 155/91 (BP Location: Left arm, Patient Position: Sitting, Cuff Size: Adult Large)   Pulse 79   Ht 1.651 m (5' 5\")   Wt 93.1 kg (205 lb 4.8 oz)   SpO2 99%   BMI 34.16 kg/m    BP (!) 155/91 (BP Location: Left arm, Patient Position: Sitting, Cuff Size: Adult Large)   Pulse 79   Ht 1.651 m (5' 5\")   Wt 93.1 kg (205 lb 4.8 oz)   SpO2 99%   BMI 34.16 kg/m    Body mass index is 34.16 kg/m .  Physical Exam   GENERAL: Healthy, alert and no distress  EYES: Eyes grossly normal to inspection.  No discharge or erythema, or obvious scleral/conjunctival abnormalities.  RESP: No audible wheeze, cough, or visible cyanosis.  No visible retractions or increased work of breathing.    SKIN: Visible skin clear. No significant rash, abnormal pigmentation or lesions.  NEURO: Cranial nerves grossly intact.  Mentation and speech appropriate for " age.  PSYCH: Mentation appears normal, affect normal/bright, judgement and insight intact, normal speech and appearance well-groomed.       In summary, Donna Najera has Class I obesity with a body mass index of Body mass index is 34.16 kg/m . kg/m2 and the comorbidities stated above. She has a history of VBG to RNYGB in 2007 and is here to re-establish bariatric care and discuss abdominal pain.          Sincerely,     Megan Villeda NP

## 2022-06-14 NOTE — NURSING NOTE
"(   Chief Complaint   Patient presents with     New Patient     New re-est wls    )    ( Weight: 205 lb 4.8 oz )  ( Height: 5' 5\" )  ( BMI (Calculated): 34.16 )  (   )  (   )  (   )  (   )  (   )  (   )    ( BP: (!) 155/91 )  (   )  (   )  (   )  ( Pulse: 79 )  (   )  ( SpO2: 99 % )    (   Patient Active Problem List   Diagnosis     Hypertension     Headache     Constipation     Insomnia     Plantar Fasciitis     Vitamin B12 Deficiency     Vitamin D Deficiency     Hyperlipidemia     Postgastrectomy Malabsorption     Diarrhea     Fever (Symptom)     Cough     Community-acquired Pneumonia     Abdominal Pain     Anemia     Edema     Major Depression, Recurrent     Generalized Anxiety Disorder     Opioid Dependence With Continuous Use     Chronic pain syndrome     Clinical diagnosis of COVID-19     SBO (small bowel obstruction) (H)     H/O gastric bypass     Small bowel obstruction (H)     Pulmonary nodules     Post herpetic neuralgia    )  (   Current Outpatient Medications   Medication Sig Dispense Refill     acetaminophen (TYLENOL) 500 MG tablet Take 650 mg by mouth every 6 hours as needed for mild pain       amphetamine-dextroamphetamine (ADDERALL XR) 30 MG 24 hr capsule TAKE 1 CAPSULE BY MOUTH EVERY MORNING TO BE USED WITH 20 MG ER FOR A TOTAL OF 50 MG DAILY       amphetamine-dextroamphetamine (ADDERALL) 20 MG tablet TAKE 1 TABLET BY MOUTH THREE TIMES DAILY       cyanocobalamin (CYANOCOBALAMIN) 1000 MCG/ML injection        ibuprofen (ADVIL/MOTRIN) 600 MG tablet Take 600 mg by mouth every 6 hours as needed for moderate pain       omeprazole (PRILOSEC) 40 MG DR capsule Take 40 mg by mouth daily       ondansetron (ZOFRAN ODT) 4 MG ODT tab Take 4 mg by mouth       apixaban ANTICOAGULANT (ELIQUIS) 2.5 mg Tab tablet [APIXABAN ANTICOAGULANT (ELIQUIS) 2.5 MG TAB TABLET] Take 1 tablet (2.5 mg total) by mouth 2 (two) times a day. 60 tablet 0     pregabalin (LYRICA) 75 MG capsule [PREGABALIN (LYRICA) 75 MG CAPSULE] Take 75 mg " by mouth 2 (two) times a day.      )  ( Diabetes Eval:    )    ( Pain Eval:  Moderate Pain (5) )    ( Wound Eval:       )    (   History   Smoking Status     Current Every Day Smoker     Packs/day: 0.75     Years: 36.00   Smokeless Tobacco     Never Used    )    ( Signed By:  Jacey Portillo, EMT; June 14, 2022; 10:48 AM )

## 2022-06-15 NOTE — ASSESSMENT & PLAN NOTE
"VBG in early 1990s with revision to RNYGB with Dr. JACOME in 2007 due to recurrent episodes of nausea and vomiting and difficulty with weight loss. Revision there was extensive dense adhesions and ultimately spleen needed to be removed. She did well for a number of years s/p RNYGB with weight loss and maintenance. She had significant epigastric pain starting in 2010 and diagnosed with H.Pylori in 2011. During that time she was treated for pain with opioids and ended up becoming addicted. She was treated in rehab and is now clean, trying to avoid opioids when able. 2 episodes of SBO between 2010 and 2020 and otherwise did very well between 2012 and 2020- minimal issues, felt great. In early 2020, she contracted covid 19 and had significant course of infection and has had persistent long haul symptoms to include headaches, narcolepsy, rashes, fatigue to where there will be days in a row where she \"hybernates\", increased arthritis. Significant decrease in activity level from fatigue and arthritis contributing to weight gain of 40lb. Has also noted increased frequency of SOB since 2020 (5 in the last 2 years) most recently 4/2022. She notes each episode of SOB becomes more sever and more difficult to manage.     Since hospitalization x 3 days in April for SOB. She reports onset of epigastric pain with swallowing of liquids and solids which lasts 1-2 hours and often gradually moves to RUQ pain over time. Pain is described as a grabbing pain of the intestines. She does not however get these symptoms with every eating episode. If she keeps food \"mushy\" she tolerates it much better. For pain she has tried omeprazole 40mg daily, ibuprofen 600mg (2-3 at a time), maalox, gasx none of which are particularly helpful. Soda, cold water, spicy food, meat tend to make things worse. She notes tenderness to crossing arms across epigastric region of stomach. Negative H.Pylori in May 2022.    No tolerating any supplements- pain, upset " stomach     CT scans reviewed.    Concern for stricture based on Ct and symptoms. Recommend EGD. Suspect adhesions contributing to stool burden and abdominal pain. Will review with bariatric surgeon. Discussed keeping stool soft- metamucil already on board and will add miralax daily. Discussed that adhesions are not likely to be repaired surgically as it increases risk for additional adhesions but will defer to surgeon. Discussed potential for referral to pain management in the future. Will start with trying levsin for cramping. Declines maximizing PPI at this time. Encouraged decrease NSAID use due to risk for ulcers.     Discussed diet and how that is potentially impacting her symptoms. High carbohydrate diets cause gas and bloating s/p RNYGB. Decreasing CHO will significantly improve symptoms. Additionally, avoiding carbonation can be beneficial. She declines dietitian visit at this time.     Bariatric labs ordered. Concern for developing vitamin deficiency and malnutrition. Recommend adding lactos free protein shake- will find list.    Plan:  Schedule endoscopy   -Call Carl at [phone] for scheduling  Labs when able  Continue omeprazole 40mg   Miralax 1 capful daily in 8 oz of water daily   Continue metamucil   Consider working on decreasing bread, rice, pasta- can help with bloating and gas and GI issues   Consider trying non-dairy / non lactose protein shake

## 2022-06-17 ENCOUNTER — TELEPHONE (OUTPATIENT)
Dept: GASTROENTEROLOGY | Facility: CLINIC | Age: 57
End: 2022-06-17

## 2022-06-17 NOTE — TELEPHONE ENCOUNTER
Attempted to contact patient regarding upcoming EGD procedure on 6/29/22 for pre assessment questions. No answer.     Left message to return call to [phone] #3    Covid test scheduled? No. Discuss at home rapid antigen COVID test 1-2 days prior to procedure.    Arrival time: 1300    Facility location: [facility]     Sedation type: CS - fentanyl intolerance is n/v    Indication for procedure: s/p gastric bypass with epigastric pain and concern for stricture    Anticoagulants: no.     Bessy John RN

## 2022-06-24 NOTE — TELEPHONE ENCOUNTER
Attempt # 2 -- Will also send DocVuet message    Attempted to contact patient regarding upcoming EGD procedure on 6/29/22 for pre assessment questions.  No answer.  Left message to return call to [phone] #2    Susanna Louise RN

## 2022-06-24 NOTE — TELEPHONE ENCOUNTER
Pre assessment questions completed for upcoming EGD procedure scheduled on 6/29/22    COVID policy reviewed. Patient to complete rapid antigen test one to two days before their scheduled procedure. Patient to bring photo of the results when they come in for their procedure.    Reviewed procedural arrival time 1300 and facility location [facility].    Designated  policy reviewed. Instructed to have someone stay 6 hours post procedure.     Procedure indication: See below    Prep instructions sent via [employer].    Reviewed EGD prep instructions with patient.     Anticoagulation/blood thinners? None    Electronic implanted devices? None    Patient verbalized understanding and had no questions or concerns at this time.    Susanna Louise RN

## 2022-06-29 ENCOUNTER — HOSPITAL ENCOUNTER (OUTPATIENT)
Facility: CLINIC | Age: 57
Discharge: HOME | End: 2022-06-29
Attending: SURGERY | Admitting: SURGERY
Payer: COMMERCIAL

## 2022-06-29 VITALS
DIASTOLIC BLOOD PRESSURE: 80 MMHG | RESPIRATION RATE: 16 BRPM | SYSTOLIC BLOOD PRESSURE: 111 MMHG | BODY MASS INDEX: 33.35 KG/M2 | OXYGEN SATURATION: 96 % | WEIGHT: 200.18 LBS | HEIGHT: 65 IN | HEART RATE: 87 BPM | TEMPERATURE: 97.9 F

## 2022-06-29 LAB — UPPER GI ENDOSCOPY: NORMAL

## 2022-06-29 PROCEDURE — 999N000099 HC STATISTIC MODERATE SEDATION < 10 MIN: Performed by: SURGERY

## 2022-06-29 PROCEDURE — 88305 TISSUE EXAM BY PATHOLOGIST: CPT | Mod: 26 | Performed by: PATHOLOGY

## 2022-06-29 PROCEDURE — 250N000009 HC RX 250: Performed by: SURGERY

## 2022-06-29 PROCEDURE — 43239 EGD BIOPSY SINGLE/MULTIPLE: CPT | Performed by: SURGERY

## 2022-06-29 PROCEDURE — 88305 TISSUE EXAM BY PATHOLOGIST: CPT | Mod: TC | Performed by: SURGERY

## 2022-06-29 PROCEDURE — G0500 MOD SEDAT ENDO SERVICE >5YRS: HCPCS | Performed by: SURGERY

## 2022-06-29 PROCEDURE — 250N000011 HC RX IP 250 OP 636: Performed by: SURGERY

## 2022-06-29 RX ORDER — ONDANSETRON 2 MG/ML
INJECTION INTRAMUSCULAR; INTRAVENOUS PRN
Status: COMPLETED | OUTPATIENT
Start: 2022-06-29 | End: 2022-06-29

## 2022-06-29 RX ORDER — OXYCODONE AND ACETAMINOPHEN 5; 325 MG/1; MG/1
TABLET ORAL
COMMUNITY
Start: 2022-06-24 | End: 2022-07-04

## 2022-06-29 RX ORDER — FENTANYL CITRATE 50 UG/ML
INJECTION, SOLUTION INTRAMUSCULAR; INTRAVENOUS PRN
Status: COMPLETED | OUTPATIENT
Start: 2022-06-29 | End: 2022-06-29

## 2022-06-29 RX ADMIN — MIDAZOLAM 1 MG: 1 INJECTION INTRAMUSCULAR; INTRAVENOUS at 14:52

## 2022-06-29 RX ADMIN — FENTANYL CITRATE 100 MCG: 50 INJECTION, SOLUTION INTRAMUSCULAR; INTRAVENOUS at 14:36

## 2022-06-29 RX ADMIN — FENTANYL CITRATE 100 MCG: 50 INJECTION, SOLUTION INTRAMUSCULAR; INTRAVENOUS at 14:39

## 2022-06-29 RX ADMIN — TOPICAL ANESTHETIC 1 SPRAY: 200 SPRAY DENTAL; PERIODONTAL at 14:49

## 2022-06-29 RX ADMIN — FENTANYL CITRATE 50 MCG: 50 INJECTION, SOLUTION INTRAMUSCULAR; INTRAVENOUS at 14:48

## 2022-06-29 RX ADMIN — ONDANSETRON 4 MG: 2 INJECTION INTRAMUSCULAR; INTRAVENOUS at 14:33

## 2022-06-29 RX ADMIN — MIDAZOLAM 1 MG: 1 INJECTION INTRAMUSCULAR; INTRAVENOUS at 14:43

## 2022-06-29 RX ADMIN — MIDAZOLAM 1 MG: 1 INJECTION INTRAMUSCULAR; INTRAVENOUS at 14:50

## 2022-06-29 RX ADMIN — MIDAZOLAM 1 MG: 1 INJECTION INTRAMUSCULAR; INTRAVENOUS at 14:46

## 2022-06-29 RX ADMIN — MIDAZOLAM 2 MG: 1 INJECTION INTRAMUSCULAR; INTRAVENOUS at 14:36

## 2022-06-29 RX ADMIN — MIDAZOLAM 1 MG: 1 INJECTION INTRAMUSCULAR; INTRAVENOUS at 14:41

## 2022-06-29 NOTE — PRE-PROCEDURE
Please note that I obtained consent from Ms. Najera in the presence of her .    I signed consent on ipad; Donna signed the consent as well; I served as witness as well and accepted the consent.    Apparently the consent did not record into the epic system and is not visible.    However, this will serve as consent; please see Lucio Pedraza RN note; he also observed consent and ultimately saw the accepted consent error as noted in his note.    Carloz Ledezma MD  Surgery  [phone] (hospital )  [phone] (clinic nurses)

## 2022-06-29 NOTE — OR NURSING
"RE: consent for EGD.    Writer saw MD's signature, pt's signature and writer was a witness via IPAD consent.  After hitting \"accept\" IPAD said consent was accepted.  In computer it had error message saying \"an error occurred saving the consent...\"    Consent was done prior to procedure.  IPAD error.  "

## 2022-06-29 NOTE — OR NURSING
Procedure: EGD with biopsies  Sedation: conscious sedation  Specimens: x 1, sent to lab.   O2: 2L/NC  Tolerated procedure: well  Pt to recovery area in stable condition accompanied by RN.   Other:  none    Ju Connelly RN

## 2022-06-29 NOTE — H&P
[facility] Anesthesia Pre-op History and Physical    Donna Najera MRN# [id number]   Age: 57 year old YOB: 1965      Date of Surgery: 06/29/22   [facility]      Date of Exam 6/29/2022 Facility (In hospital)       Home clinic: n/a  Primary care provider: No Ref-Primary, Physician         Chief Complaint and/or Reason for Procedure:   No chief complaint on file.           Active problem list:     Patient Active Problem List    Diagnosis Date Noted     Edema of lower extremity 06/14/2022     Priority: Medium     Obstructive sleep apnea syndrome 06/14/2022     Priority: Medium     History of 2019 novel coronavirus disease (COVID-19) 12/10/2020     Priority: Medium     Clinical diagnosis of COVID-19 10/11/2020     Priority: Medium     10/2/2020         SBO (small bowel obstruction) (H) 10/11/2020     Priority: Medium     H/O gastric bypass 10/11/2020     Priority: Medium     Small bowel obstruction (H) 10/11/2020     Priority: Medium     Pulmonary nodules 10/11/2020     Priority: Medium     Post herpetic neuralgia 10/11/2020     Priority: Medium     Opioid Dependence With Continuous Use      Priority: Medium     Created by Conversion         Chronic pain syndrome      Priority: Medium     Created by Conversion         Iron deficiency 02/04/2020     Priority: Medium     Headache      Priority: Medium     Created by Conversion         Hypertension      Priority: Medium     Created by Conversion  Replacement Utility updated for latest IMO load         Vitamin D Deficiency      Priority: Medium     Created by Conversion  Replacement Utility updated for latest IMO load         Abdominal Pain      Priority: Medium     Created by Conversion  Replacement Utility updated for latest IMO load         Major Depression, Recurrent      Priority: Medium     Created by Conversion  Replacement Utility updated for latest IMO load         Pernicious anemia 04/25/2014  "    Priority: Medium     Vitamin B12 Deficiency      Priority: Medium     Created by Conversion         Hyperlipidemia      Priority: Medium     Created by Conversion         Postgastrectomy Malabsorption      Priority: Medium     Created by Conversion         Diarrhea      Priority: Medium     Created by Conversion         Fever (Symptom)      Priority: Medium     Created by Conversion         Cough      Priority: Medium     Created by Conversion         Community-acquired Pneumonia      Priority: Medium     Created by Conversion         Anemia      Priority: Medium     Created by Conversion         Edema      Priority: Medium     Created by Conversion         Generalized Anxiety Disorder      Priority: Medium     Created by Conversion                Medications (include herbals and vitamins):   Any Plavix use in the last 7 days? No     No current facility-administered medications for this encounter.             Allergies:      Allergies   Allergen Reactions     Gabapentin Nausea     Meperidine Nausea and Rash     Allergy to Latex? No  Allergy to tape?   No  Intolerances: none            Physical Exam:   All vitals have been reviewed  Patient Vitals for the past 8 hrs:   BP Temp Temp src Resp SpO2 Height Weight   06/29/22 1343 (!) 130/101 100.1  F (37.8  C) Oral 14 100 % -- --   06/29/22 1320 -- -- -- -- -- 1.651 m (5' 5\") 90.8 kg (200 lb 2.8 oz)     No intake/output data recorded.  Breathing unlabored.          Lab / Radiology Results:   Guidelines for CXR: new or unstable cardio-pulmonary disease         Anesthetic risk and/or ASA classification:   ASA 3; plan upper endoscopy for pain; obstruction.  Prior gastric bypass converted from VBG in 2007 by Erlinda.    Carloz Ledezma MD       "

## 2022-06-30 LAB
PATH REPORT.COMMENTS IMP SPEC: NORMAL
PATH REPORT.FINAL DX SPEC: NORMAL
PATH REPORT.GROSS SPEC: NORMAL
PATH REPORT.MICROSCOPIC SPEC OTHER STN: NORMAL
PATH REPORT.RELEVANT HX SPEC: NORMAL
PHOTO IMAGE: NORMAL

## 2022-07-02 ENCOUNTER — HEALTH MAINTENANCE LETTER (OUTPATIENT)
Age: 57
End: 2022-07-02

## 2022-07-26 ENCOUNTER — TELEPHONE (OUTPATIENT)
Dept: ENDOCRINOLOGY | Facility: CLINIC | Age: 57
End: 2022-07-26

## 2022-07-26 NOTE — TELEPHONE ENCOUNTER
LVM to kasey a f/u with Megan in 6 mon around 12/14/22. Sooner if needed Schedule with Dr. Coffey after EGD  [

## 2022-12-26 ENCOUNTER — HEALTH MAINTENANCE LETTER (OUTPATIENT)
Age: 57
End: 2022-12-26

## 2023-01-25 ENCOUNTER — TRANSFERRED RECORDS (OUTPATIENT)
Dept: HEALTH INFORMATION MANAGEMENT | Facility: CLINIC | Age: 58
End: 2023-01-25
Payer: COMMERCIAL

## 2023-01-25 LAB
CREATININE (EXTERNAL): 0.81 MG/DL (ref 0.55–1.02)
GFR ESTIMATED (EXTERNAL): >60 ML/MIN/1.73M2
GLUCOSE (EXTERNAL): 83 MG/DL (ref 70–100)
POTASSIUM (EXTERNAL): 4.4 MMOL/L (ref 3.5–5.1)

## 2023-01-28 ENCOUNTER — MEDICAL CORRESPONDENCE (OUTPATIENT)
Dept: HEALTH INFORMATION MANAGEMENT | Facility: CLINIC | Age: 58
End: 2023-01-28
Payer: COMMERCIAL

## 2023-01-28 ENCOUNTER — TRANSFERRED RECORDS (OUTPATIENT)
Dept: HEALTH INFORMATION MANAGEMENT | Facility: CLINIC | Age: 58
End: 2023-01-28
Payer: COMMERCIAL

## 2023-01-31 ENCOUNTER — TRANSCRIBE ORDERS (OUTPATIENT)
Dept: OTHER | Age: 58
End: 2023-01-31

## 2023-01-31 DIAGNOSIS — R10.9 ABDOMINAL PAIN: Primary | ICD-10-CM

## 2023-01-31 DIAGNOSIS — R11.0 NAUSEA: ICD-10-CM

## 2023-01-31 DIAGNOSIS — F41.9 ANXIETY: ICD-10-CM

## 2023-07-15 ENCOUNTER — HEALTH MAINTENANCE LETTER (OUTPATIENT)
Age: 58
End: 2023-07-15

## 2024-06-23 ENCOUNTER — HEALTH MAINTENANCE LETTER (OUTPATIENT)
Age: 59
End: 2024-06-23

## 2024-09-01 ENCOUNTER — HEALTH MAINTENANCE LETTER (OUTPATIENT)
Age: 59
End: 2024-09-01

## (undated) RX ORDER — FENTANYL CITRATE 50 UG/ML
INJECTION, SOLUTION INTRAMUSCULAR; INTRAVENOUS
Status: DISPENSED
Start: 2022-06-29

## (undated) RX ORDER — HYDROMORPHONE HCL IN WATER/PF 6 MG/30 ML
PATIENT CONTROLLED ANALGESIA SYRINGE INTRAVENOUS
Status: DISPENSED
Start: 2022-06-29

## (undated) RX ORDER — ONDANSETRON 2 MG/ML
INJECTION INTRAMUSCULAR; INTRAVENOUS
Status: DISPENSED
Start: 2022-06-29